# Patient Record
Sex: FEMALE | Race: WHITE | Employment: STUDENT | URBAN - METROPOLITAN AREA
[De-identification: names, ages, dates, MRNs, and addresses within clinical notes are randomized per-mention and may not be internally consistent; named-entity substitution may affect disease eponyms.]

---
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
## 2024-03-07 ENCOUNTER — APPOINTMENT (OUTPATIENT)
Dept: CT IMAGING | Age: 19
End: 2024-03-07
Payer: COMMERCIAL

## 2024-03-07 ENCOUNTER — HOSPITAL ENCOUNTER (EMERGENCY)
Age: 19
Discharge: HOME | End: 2024-03-07
Payer: COMMERCIAL

## 2024-03-07 VITALS
SYSTOLIC BLOOD PRESSURE: 134 MMHG | HEART RATE: 81 BPM | DIASTOLIC BLOOD PRESSURE: 80 MMHG | TEMPERATURE: 99.1 F | OXYGEN SATURATION: 99 % | RESPIRATION RATE: 16 BRPM

## 2024-03-07 DIAGNOSIS — R10.30 LOWER ABDOMINAL PAIN: Primary | ICD-10-CM

## 2024-03-07 DIAGNOSIS — K59.00 CONSTIPATION, UNSPECIFIED CONSTIPATION TYPE: ICD-10-CM

## 2024-03-07 LAB
ALBUMIN SERPL-MCNC: 4.5 G/DL (ref 3.4–5)
ALBUMIN/GLOB SERPL: 1.5 {RATIO} (ref 1.1–2.2)
ALP SERPL-CCNC: 94 U/L (ref 40–129)
ALT SERPL-CCNC: 28 U/L (ref 10–40)
ANION GAP SERPL CALCULATED.3IONS-SCNC: 11 MMOL/L (ref 3–16)
AST SERPL-CCNC: 25 U/L (ref 15–37)
BASOPHILS # BLD: 0.1 K/UL (ref 0–0.2)
BILIRUB SERPL-MCNC: <0.2 MG/DL (ref 0–1)
BILIRUB UR QL STRIP.AUTO: NEGATIVE
BUN SERPL-MCNC: 13 MG/DL (ref 7–20)
CALCIUM SERPL-MCNC: 9.5 MG/DL (ref 8.3–10.6)
CHLORIDE SERPL-SCNC: 103 MMOL/L (ref 99–110)
CLARITY UR: CLEAR
CO2 SERPL-SCNC: 28 MMOL/L (ref 21–32)
COLOR UR: YELLOW
CREAT SERPL-MCNC: 1 MG/DL (ref 0.6–1.1)
DEPRECATED RDW RBC AUTO: 14 % (ref 12.4–15.4)
EOSINOPHIL # BLD: 0.1 K/UL (ref 0–0.6)
EOSINOPHIL NFR BLD: 1 %
GFR SERPLBLD CREATININE-BSD FMLA CKD-EPI: >60 ML/MIN/{1.73_M2}
GLUCOSE SERPL-MCNC: 92 MG/DL (ref 70–99)
GLUCOSE UR STRIP.AUTO-MCNC: NEGATIVE MG/DL
HCG SERPL QL: NEGATIVE
HGB UR QL STRIP.AUTO: NEGATIVE
KETONES UR STRIP.AUTO-MCNC: NEGATIVE MG/DL
LEUKOCYTE ESTERASE UR QL STRIP.AUTO: NEGATIVE
LIPASE SERPL-CCNC: 32 U/L (ref 13–60)
LYMPHOCYTES # BLD: 3.8 K/UL (ref 1–5.1)
LYMPHOCYTES NFR BLD: 35.6 %
MCH RBC QN AUTO: 27.5 PG (ref 26–34)
MCHC RBC AUTO-ENTMCNC: 33 G/DL (ref 31–36)
MCV RBC AUTO: 83.5 FL (ref 80–100)
MONOCYTES # BLD: 0.7 K/UL (ref 0–1.3)
MONOCYTES NFR BLD: 6.7 %
NEUTROPHILS # BLD: 5.9 K/UL (ref 1.7–7.7)
NEUTROPHILS NFR BLD: 55.4 %
NITRITE UR QL STRIP.AUTO: NEGATIVE
PH UR STRIP.AUTO: 6.5 [PH] (ref 5–8)
PLATELET # BLD AUTO: 315 K/UL (ref 135–450)
PMV BLD AUTO: 8.3 FL (ref 5–10.5)
POTASSIUM SERPL-SCNC: 4.5 MMOL/L (ref 3.5–5.1)
PROT UR STRIP.AUTO-MCNC: NEGATIVE MG/DL
RBC # BLD AUTO: 5.2 M/UL (ref 4–5.2)
SP GR UR STRIP.AUTO: 1.01 (ref 1–1.03)
UA COMPLETE W REFLEX CULTURE PNL UR: NORMAL
UA DIPSTICK W REFLEX MICRO PNL UR: NORMAL
URN SPEC COLLECT METH UR: NORMAL
UROBILINOGEN UR STRIP-ACNC: 0.2 E.U./DL
WBC # BLD AUTO: 10.6 K/UL (ref 4–11)

## 2024-03-07 PROCEDURE — 81003 URINALYSIS AUTO W/O SCOPE: CPT

## 2024-03-07 PROCEDURE — 6370000000 HC RX 637 (ALT 250 FOR IP): Performed by: PHYSICIAN ASSISTANT

## 2024-03-07 PROCEDURE — 6360000002 HC RX W HCPCS: Performed by: PHYSICIAN ASSISTANT

## 2024-03-07 PROCEDURE — 85025 COMPLETE CBC W/AUTO DIFF WBC: CPT

## 2024-03-07 PROCEDURE — 6360000004 HC RX CONTRAST MEDICATION: Performed by: PHYSICIAN ASSISTANT

## 2024-03-07 PROCEDURE — 84703 CHORIONIC GONADOTROPIN ASSAY: CPT

## 2024-03-07 PROCEDURE — 74177 CT ABD & PELVIS W/CONTRAST: CPT

## 2024-03-07 PROCEDURE — 83690 ASSAY OF LIPASE: CPT

## 2024-03-07 PROCEDURE — 80053 COMPREHEN METABOLIC PANEL: CPT

## 2024-03-07 PROCEDURE — 96374 THER/PROPH/DIAG INJ IV PUSH: CPT

## 2024-03-07 PROCEDURE — 99285 EMERGENCY DEPT VISIT HI MDM: CPT

## 2024-03-07 RX ORDER — TRAMADOL HYDROCHLORIDE 50 MG/1
50 TABLET ORAL ONCE
Status: COMPLETED | OUTPATIENT
Start: 2024-03-07 | End: 2024-03-07

## 2024-03-07 RX ORDER — IBUPROFEN 600 MG/1
600 TABLET ORAL
Qty: 40 TABLET | Refills: 0 | Status: SHIPPED | OUTPATIENT
Start: 2024-03-07

## 2024-03-07 RX ORDER — KETOROLAC TROMETHAMINE 30 MG/ML
30 INJECTION, SOLUTION INTRAMUSCULAR; INTRAVENOUS ONCE
Status: COMPLETED | OUTPATIENT
Start: 2024-03-07 | End: 2024-03-07

## 2024-03-07 RX ORDER — TRAMADOL HYDROCHLORIDE 50 MG/1
50 TABLET ORAL
Qty: 8 TABLET | Refills: 0 | Status: SHIPPED | OUTPATIENT
Start: 2024-03-07 | End: 2024-03-12

## 2024-03-07 RX ADMIN — KETOROLAC TROMETHAMINE 30 MG: 30 INJECTION, SOLUTION INTRAMUSCULAR; INTRAVENOUS at 21:28

## 2024-03-07 RX ADMIN — TRAMADOL HYDROCHLORIDE 50 MG: 50 TABLET ORAL at 21:27

## 2024-03-07 RX ADMIN — IOPAMIDOL 75 ML: 755 INJECTION, SOLUTION INTRAVENOUS at 20:09

## 2024-03-07 ASSESSMENT — PAIN SCALES - GENERAL
PAINLEVEL_OUTOF10: 7
PAINLEVEL_OUTOF10: 8

## 2024-03-07 ASSESSMENT — PAIN - FUNCTIONAL ASSESSMENT: PAIN_FUNCTIONAL_ASSESSMENT: 0-10

## 2024-03-08 NOTE — DISCHARGE INSTRUCTIONS
Laboratory studies showed no sign of UTI or overwhelming infection.  Normal kidney liver function noted.  Abdominal pelvic CT scan not showing any concerning pathology.  The CT scan did show mild constipation.  Recommend MiraLAX, Dulcolax, Colace or milk of magnesia to help clear constipation.  Recommend contact gynecology in the morning for an appointment Friday or Monday or keep your Tuesday appointment.

## 2024-03-08 NOTE — ED PROVIDER NOTES
medications:  Medications   iopamidol (ISOVUE-370) 76 % injection 75 mL (75 mLs IntraVENous Given 3/7/24 2009)   ketorolac (TORADOL) injection 30 mg (30 mg IntraVENous Given 3/7/24 2128)   traMADol (ULTRAM) tablet 50 mg (50 mg Oral Given 3/7/24 2127)             Is this patient to be included in the SEP-1 Core Measure due to severe sepsis or septic shock?   No   Exclusion criteria - the patient is NOT to be included for SEP-1 Core Measure due to:  Infection is not suspected    Chronic Conditions affecting care: None   has no past medical history on file.    CONSULTS: (Who and What was discussed)  None    Records Reviewed (External and Source) none    CC/HPI Summary, DDx, ED Course, and Reassessment:     This patient presenting with lower abdominal pain left lower quadrant slightly more than right lower quadrant.  Little midline.  Laboratory studies obtained showing WBC of 10.6 and hemoglobin 14.3.  The patient's CMP showing normal renal and hepatic function.  Lipase normal at 32.  Serum hCG negative.  UA negative.  Abdominal 5 days CT scan showing no acute pathology.  Mild constipation noted.  In the emergency and the patient was given Toradol 30 mg IV and tramadol 50 mg p.o.  The patient had some improvement.  The patient will be discharged with boyfriend.    Disposition Considerations (tests considered but not done, Admit vs D/C, Shared Decision Making, Pt Expectation of Test or Tx.):     At this point in believe patient safe for discharge home.  Diagnosis low abdominal pain and constipation.  I cannot exclude subtle ovarian pathology.  Low suspicion for ovarian cyst or torsion as she has bilateral complaint.  She does have an appointment on Tuesday with gynecology.  Recommend she contact gynecology tomorrow for an appointment tomorrow or Monday preferably somewhat earlier than Tuesday.  I did send prescription to her pharmacy for tramadol and ibuprofen.  She will use Tylenol 1000 mg every 6 or 8 hours for

## 2024-08-05 ENCOUNTER — OFFICE VISIT (OUTPATIENT)
Dept: ORTHOPEDIC SURGERY | Age: 19
End: 2024-08-05
Payer: COMMERCIAL

## 2024-08-05 ENCOUNTER — TELEPHONE (OUTPATIENT)
Dept: ORTHOPEDIC SURGERY | Age: 19
End: 2024-08-05

## 2024-08-05 VITALS — BODY MASS INDEX: 30.53 KG/M2 | WEIGHT: 190 LBS | HEIGHT: 66 IN

## 2024-08-05 DIAGNOSIS — M54.2 CERVICAL PAIN: ICD-10-CM

## 2024-08-05 DIAGNOSIS — Z86.69 HISTORY OF THORACIC OUTLET SYNDROME: ICD-10-CM

## 2024-08-05 DIAGNOSIS — M54.12 CERVICAL RADICULOPATHY: ICD-10-CM

## 2024-08-05 DIAGNOSIS — M25.511 RIGHT SHOULDER PAIN, UNSPECIFIED CHRONICITY: Primary | ICD-10-CM

## 2024-08-05 PROCEDURE — 99204 OFFICE O/P NEW MOD 45 MIN: CPT | Performed by: ORTHOPAEDIC SURGERY

## 2024-08-05 NOTE — TELEPHONE ENCOUNTER
[facility] be [facility] in a day or two

## 2024-11-22 ENCOUNTER — HOSPITAL ENCOUNTER (EMERGENCY)
Age: 19
Discharge: HOME | End: 2024-11-22
Attending: STUDENT IN AN ORGANIZED HEALTH CARE EDUCATION/TRAINING PROGRAM
Payer: COMMERCIAL

## 2024-11-22 ENCOUNTER — APPOINTMENT (OUTPATIENT)
Dept: GENERAL RADIOLOGY | Age: 19
End: 2024-11-22
Payer: COMMERCIAL

## 2024-11-22 VITALS
OXYGEN SATURATION: 100 % | BODY MASS INDEX: 28.93 KG/M2 | HEIGHT: 66 IN | RESPIRATION RATE: 16 BRPM | WEIGHT: 180 LBS | HEART RATE: 91 BPM | TEMPERATURE: 98.3 F | SYSTOLIC BLOOD PRESSURE: 124 MMHG | DIASTOLIC BLOOD PRESSURE: 74 MMHG

## 2024-11-22 DIAGNOSIS — Z00.8 ENCOUNTER FOR MEDICAL ASSESSMENT: Primary | ICD-10-CM

## 2024-11-22 LAB
ALBUMIN SERPL-MCNC: 4.5 G/DL (ref 3.4–5)
ALBUMIN/GLOB SERPL: 1.6 {RATIO} (ref 1.1–2.2)
ALP SERPL-CCNC: 82 U/L (ref 40–129)
ALT SERPL-CCNC: 14 U/L (ref 10–40)
AMPHETAMINES UR QL SCN>1000 NG/ML: NORMAL
ANION GAP SERPL CALCULATED.3IONS-SCNC: 14 MMOL/L (ref 3–16)
AST SERPL-CCNC: 22 U/L (ref 15–37)
BARBITURATES UR QL SCN>200 NG/ML: NORMAL
BASOPHILS # BLD: 0 K/UL (ref 0–0.2)
BASOPHILS NFR BLD: 0.4 %
BENZODIAZ UR QL SCN>200 NG/ML: NORMAL
BILIRUB SERPL-MCNC: <0.2 MG/DL (ref 0–1)
BILIRUB UR QL STRIP.AUTO: NEGATIVE
BUN SERPL-MCNC: 12 MG/DL (ref 7–20)
CALCIUM SERPL-MCNC: 9.5 MG/DL (ref 8.3–10.6)
CANNABINOIDS UR QL SCN>50 NG/ML: NORMAL
CHLORIDE SERPL-SCNC: 95 MMOL/L (ref 99–110)
CLARITY UR: CLEAR
CO2 SERPL-SCNC: 24 MMOL/L (ref 21–32)
COCAINE UR QL SCN: NORMAL
COLOR UR: YELLOW
CREAT SERPL-MCNC: 0.7 MG/DL (ref 0.6–1.1)
DEPRECATED RDW RBC AUTO: 13.5 % (ref 12.4–15.4)
DRUG SCREEN COMMENT UR-IMP: NORMAL
EKG ATRIAL RATE: 79 BPM
EKG DIAGNOSIS: NORMAL
EKG P AXIS: 74 DEGREES
EKG P-R INTERVAL: 124 MS
EKG Q-T INTERVAL: 382 MS
EKG QRS DURATION: 92 MS
EKG QTC CALCULATION (BAZETT): 438 MS
EKG R AXIS: 72 DEGREES
EKG T AXIS: 37 DEGREES
EKG VENTRICULAR RATE: 79 BPM
EOSINOPHIL # BLD: 0 K/UL (ref 0–0.6)
EOSINOPHIL NFR BLD: 0.5 %
ETHANOLAMINE SERPL-MCNC: NORMAL MG/DL (ref 0–0.08)
FENTANYL SCREEN, URINE: NORMAL
GFR SERPLBLD CREATININE-BSD FMLA CKD-EPI: >90 ML/MIN/{1.73_M2}
GLUCOSE SERPL-MCNC: 105 MG/DL (ref 70–99)
GLUCOSE UR STRIP.AUTO-MCNC: NEGATIVE MG/DL
HCG SERPL QL: NEGATIVE
HCT VFR BLD AUTO: 43.2 % (ref 36–48)
HGB BLD-MCNC: 14.4 G/DL (ref 12–16)
HGB UR QL STRIP.AUTO: NEGATIVE
KETONES UR STRIP.AUTO-MCNC: NEGATIVE MG/DL
LEUKOCYTE ESTERASE UR QL STRIP.AUTO: NEGATIVE
LYMPHOCYTES # BLD: 2.6 K/UL (ref 1–5.1)
LYMPHOCYTES NFR BLD: 27.9 %
MCH RBC QN AUTO: 29 PG (ref 26–34)
MCHC RBC AUTO-ENTMCNC: 33.4 G/DL (ref 31–36)
MCV RBC AUTO: 86.7 FL (ref 80–100)
METHADONE UR QL SCN>300 NG/ML: NORMAL
MONOCYTES # BLD: 0.5 K/UL (ref 0–1.3)
MONOCYTES NFR BLD: 5.3 %
NEUTROPHILS # BLD: 6.1 K/UL (ref 1.7–7.7)
NEUTROPHILS NFR BLD: 65.9 %
NITRITE UR QL STRIP.AUTO: NEGATIVE
OPIATES UR QL SCN>300 NG/ML: NORMAL
OXYCODONE UR QL SCN: NORMAL
PCP UR QL SCN>25 NG/ML: NORMAL
PH UR STRIP.AUTO: 6 [PH] (ref 5–8)
PH UR STRIP: 6 [PH]
PLATELET # BLD AUTO: 279 K/UL (ref 135–450)
PMV BLD AUTO: 8.1 FL (ref 5–10.5)
POTASSIUM SERPL-SCNC: 3.7 MMOL/L (ref 3.5–5.1)
PROT SERPL-MCNC: 7.4 G/DL (ref 6.4–8.2)
PROT UR STRIP.AUTO-MCNC: NEGATIVE MG/DL
RBC # BLD AUTO: 4.98 M/UL (ref 4–5.2)
SODIUM SERPL-SCNC: 133 MMOL/L (ref 136–145)
SP GR UR STRIP.AUTO: <=1.005 (ref 1–1.03)
TROPONIN, HIGH SENSITIVITY: <6 NG/L (ref 0–14)
UA DIPSTICK W REFLEX MICRO PNL UR: NORMAL
URN SPEC COLLECT METH UR: NORMAL
UROBILINOGEN UR STRIP-ACNC: 0.2 E.U./DL
WBC # BLD AUTO: 9.3 K/UL (ref 4–11)

## 2024-11-22 PROCEDURE — 80307 DRUG TEST PRSMV CHEM ANLYZR: CPT

## 2024-11-22 PROCEDURE — 99285 EMERGENCY DEPT VISIT HI MDM: CPT

## 2024-11-22 PROCEDURE — 82077 ASSAY SPEC XCP UR&BREATH IA: CPT

## 2024-11-22 PROCEDURE — 84703 CHORIONIC GONADOTROPIN ASSAY: CPT

## 2024-11-22 PROCEDURE — 85025 COMPLETE CBC W/AUTO DIFF WBC: CPT

## 2024-11-22 PROCEDURE — 84484 ASSAY OF TROPONIN QUANT: CPT

## 2024-11-22 PROCEDURE — 80053 COMPREHEN METABOLIC PANEL: CPT

## 2024-11-22 PROCEDURE — 71045 X-RAY EXAM CHEST 1 VIEW: CPT

## 2024-11-22 PROCEDURE — 81003 URINALYSIS AUTO W/O SCOPE: CPT

## 2024-11-22 PROCEDURE — 93005 ELECTROCARDIOGRAM TRACING: CPT | Performed by: STUDENT IN AN ORGANIZED HEALTH CARE EDUCATION/TRAINING PROGRAM

## 2024-11-22 PROCEDURE — 36415 COLL VENOUS BLD VENIPUNCTURE: CPT

## 2024-11-22 PROCEDURE — 93010 ELECTROCARDIOGRAM REPORT: CPT | Performed by: INTERNAL MEDICINE

## 2024-11-22 ASSESSMENT — PAIN - FUNCTIONAL ASSESSMENT
PAIN_FUNCTIONAL_ASSESSMENT: NONE - DENIES PAIN
PAIN_FUNCTIONAL_ASSESSMENT: NONE - DENIES PAIN

## 2024-11-23 NOTE — DISCHARGE INSTRUCTIONS
Follow-up with primary doctor next week.  Return to emergency department for chest pain, shortness of breath, Lancer dizziness, blurred vision, focal deficits to motor or sensory changes, weakness or numbness, confusion or altered mental status, change in behavior abdominal pain or any new change or worsening symptoms were always here for reevaluation never hesitate to return I do want someone to be with you for the next 48 hours and return for any change in symptoms.

## 2024-11-23 NOTE — ED PROVIDER NOTES
Emergency Department Encounter    Patient: Lorelei Jacobson  MRN: [id number]  : 2005  Date of Evaluation: 2024  ED Provider:  Silver Escobar MD    Triage Chief Complaint:   Ingestion (Pt brought in by EMS from home. Pt states a \"venkatesh\" gave her a new energy drink to try. Pt states about an hour after drinking she got tired and started to tingle all over. Pt states can was closed and did not appear tampered with. )    Tribal:  Lorelei Jacobson is a 19 y.o. female presenting with concerns for ingestion.  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  States that she met with a venkatesh who was interested in some beef that she did not know.  States that the venkatesh gave her an energy drink called XS which she has never had before.  She states about 10 to 15 minutes later she began feeling intoxicated/high.  States she was driving and she felt like the road was moving in front of her.  And felt intermittent waves of tingling over her body diffusely.  Denies any focal deficits or motor or sensory changes denies slurred speech.  Denies difficulty swallowing or handling secretions.  Denies blurred vision or visual changes.  States she does feel some heaviness of her chest but denies shortness of breath.  Denies abdominal pain, nausea vomiting, diarrhea constipation or urinary symptoms.  Denies lower extreme edema, orthopnea.  Denies history of DVTs or blood clots in the past, recent trauma, recent travel, recent surgeries, hormone use, cancer.  Denies signs of current DVT    ROS - see HPI, below listed is current ROS at time of my eval:  At least 14 systems reviewed, negative other than HPI    History reviewed. No pertinent past medical history.  History reviewed. No pertinent surgical history.  History reviewed. No pertinent family history.  Social History     Socioeconomic History    Marital status: Single     Spouse name: Not on file    Number of children: Not on file    Years of education: Not on file    Highest education  obtained):  Radiologist's Report Reviewed:  No results found.    EKG (if obtained): (All EKG's are interpreted by myself in the absence of a cardiologist)  Normal sinus rhythm, ventricular rate 79, ME interval 124, QRS duration 92, QTc 438, no significant ST elevation or depression, exam is not consistent with Brugada, ARVD, WPW    MDM:    19-year-old female present with history seen above.  Vitals on presentation are reassuring and patient afebrile satting well on room air.  Physical exam can be seen above.  Patient states that symptoms started about 15 minutes after she took the energy drink.  States she has never had this drink before.  She did not know the man who gave it to her but states it was closed.  Did obtain basic laboratory evaluation.  CBC reveals no leukocytosis.  Hemoglobin is within normal limits.  Platelets are within normal limits.  CMP reveals mild hyponatremia at 133.  Otherwise CMP is reassuring nonactionable.  Ethanol is not detected.  Pregnancy testing is negative.  Drug screen is negative.  UA not consistent with infection.  EKG can be seen above.  Trope is nonelevated.  Chest x-ray is nonacute.  Patient was monitored in the ED for about 3 hours.  On reevaluation she states that she feels back to baseline.  States she is no longer have any lightheadedness, dizziness, unsteadiness denies any further tingling.  States he does have a mild headache but states she has not hit her head denies any blurred vision, focal deficits, motor or sensory changes.  Patient states she is ready to go home.  I do believe this is reasonable just long as she is with someone who can monitor for the next 24 to 48 hours which she does.  I discussed not using that energy drink any longer.  discussed strict return precautions and close follow-up with patient she is agreeable to plan patient discharged home    Clinical Impression:  1. Encounter for medical assessment              Comment: Please note this report has

## 2024-11-23 NOTE — DISCHARGE INSTR - COC
Continuity of Care Form    Patient Name: Lorelei Espinal   :  2005  MRN:  [id number]    Admit date:  2024  Discharge date:  ***    Code Status Order: No Order   Advance Directives:   Advance Care Flowsheet Documentation             Admitting Physician:  No admitting provider for patient encounter.  PCP: Hussein Fitch MD    Discharging Nurse: ***  Discharging Hospital Unit/Room#:   Discharging Unit Phone Number: ***    Emergency Contact:   Extended Emergency Contact Information  Primary Emergency Contact: LIDIA ESPINAL  Home Phone: [phone]  Relation: Parent  Secondary Emergency Contact: CLEO ESPINAL  Home Phone: [phone]  Relation: Parent    Past Surgical History:  History reviewed. No pertinent surgical history.    Immunization History:     There is no immunization history on file for this patient.    Active Problems:  Patient Active Problem List   Diagnosis Code    History of thoracic outlet syndrome Z86.69    Cervical pain M54.2    Right shoulder pain M25.511    Cervical radiculopathy M54.12       Isolation/Infection:   Isolation            No Isolation          Patient Infection Status       None to display            Nurse Assessment:  Last Vital Signs: /82   Pulse 96   Temp 98.3 °F (36.8 °C) (Oral)   Resp 16   Ht 1.676 m (5' 6\")   Wt 81.6 kg (180 lb)   SpO2 100%   BMI 29.05 kg/m²     Last documented pain score (0-10 scale):    Last Weight:   Wt Readings from Last 1 Encounters:   24 81.6 kg (180 lb)     Mental Status:  {IP PT MENTAL STATUS:97269}    IV Access:  { MARIA G IV ACCESS:180169334}    Nursing Mobility/ADLs:  Walking   {CHP DME ADLs:648464456}  Transfer  {CHP DME ADLs:737140822}  Bathing  {CHP DME ADLs:125737212}  Dressing  {CHP DME ADLs:079716955}  Toileting  {CHP DME ADLs:153109487}  Feeding  {CHP DME ADLs:463391909}  Med Admin  {CHP DME ADLs:950591336}  Med Delivery   { MARIA G MED Delivery:477936471}    Wound Care Documentation and Therapy:       Labs or Other Treatments After Discharge: ***    Physician Certification: I certify the above information and transfer of Lorelei Jacobson  is necessary for the continuing treatment of the diagnosis listed and that she requires {Admit to Appropriate Level of Care:75934} for {GREATER/LESS:082255261} 30 days.     Update Admission H&P: {CHP DME Changes in HandP:240426953}    PHYSICIAN SIGNATURE:  {Esignature:898553956}